# Patient Record
Sex: FEMALE | Race: WHITE | NOT HISPANIC OR LATINO | Employment: FULL TIME | URBAN - NONMETROPOLITAN AREA
[De-identification: names, ages, dates, MRNs, and addresses within clinical notes are randomized per-mention and may not be internally consistent; named-entity substitution may affect disease eponyms.]

---

## 2017-02-24 ENCOUNTER — OFFICE VISIT (OUTPATIENT)
Dept: RETAIL CLINIC | Facility: CLINIC | Age: 22
End: 2017-02-24

## 2017-02-24 VITALS
TEMPERATURE: 99.4 F | OXYGEN SATURATION: 99 % | WEIGHT: 278 LBS | RESPIRATION RATE: 18 BRPM | SYSTOLIC BLOOD PRESSURE: 120 MMHG | HEART RATE: 82 BPM | DIASTOLIC BLOOD PRESSURE: 80 MMHG

## 2017-02-24 DIAGNOSIS — J02.0 STREP PHARYNGITIS: Primary | ICD-10-CM

## 2017-02-24 DIAGNOSIS — J06.9 ACUTE URI: ICD-10-CM

## 2017-02-24 LAB
EXPIRATION DATE: ABNORMAL
EXPIRATION DATE: NORMAL
FLUAV AG NPH QL: NEGATIVE
FLUBV AG NPH QL: NEGATIVE
INTERNAL CONTROL: ABNORMAL
INTERNAL CONTROL: NORMAL
Lab: ABNORMAL
Lab: NORMAL
S PYO AG THROAT QL: POSITIVE

## 2017-02-24 PROCEDURE — 87804 INFLUENZA ASSAY W/OPTIC: CPT | Performed by: NURSE PRACTITIONER

## 2017-02-24 PROCEDURE — 99203 OFFICE O/P NEW LOW 30 MIN: CPT | Performed by: NURSE PRACTITIONER

## 2017-02-24 PROCEDURE — 87880 STREP A ASSAY W/OPTIC: CPT | Performed by: NURSE PRACTITIONER

## 2017-02-24 RX ORDER — CEFDINIR 300 MG/1
300 CAPSULE ORAL 2 TIMES DAILY
Qty: 20 CAPSULE | Refills: 0 | Status: SHIPPED | OUTPATIENT
Start: 2017-02-24

## 2017-02-24 RX ORDER — DESOGESTREL AND ETHINYL ESTRADIOL 0.15-0.03
1 KIT ORAL DAILY
COMMUNITY

## 2017-02-24 RX ORDER — CETIRIZINE HYDROCHLORIDE 10 MG/1
10 TABLET ORAL DAILY
COMMUNITY

## 2017-02-24 RX ORDER — BROMPHENIRAMINE MALEATE, PSEUDOEPHEDRINE HYDROCHLORIDE, AND DEXTROMETHORPHAN HYDROBROMIDE 2; 30; 10 MG/5ML; MG/5ML; MG/5ML
10 SYRUP ORAL EVERY 6 HOURS PRN
Qty: 220 ML | Refills: 0 | Status: SHIPPED | OUTPATIENT
Start: 2017-02-24 | End: 2017-03-06

## 2017-02-24 RX ORDER — ONDANSETRON 4 MG/1
TABLET, ORALLY DISINTEGRATING ORAL
Qty: 10 TABLET | Refills: 0 | Status: SHIPPED | OUTPATIENT
Start: 2017-02-24

## 2017-02-24 NOTE — PATIENT INSTRUCTIONS
Complete the full course of antibiotic, even if you are feeling better. Increase fluids and rest. Use tylenol or ibuprofen for the fever and pain. Eat a soft diet until the pain resolves. Use salt water gargles and throat losenges for pain relief. You are considered contagious for 24 hours following the start of antibiotics. Use good handwashing and do not allow anyone to drink after you. After 48 hours, you should replace your toothbrush or soak it in mouth wash to prevent reinfection. Seek immediate medical attention if the pain becomes severe, or you have trouble breathing, drooling, or great difficulty swallowing.

## 2017-02-24 NOTE — PROGRESS NOTES
Subjective   tC Harvey is a 21 y.o. female.   Chief Complaint   Patient presents with   • URI   • Sore Throat      URI    This is a new problem. The current episode started in the past 7 days. The problem has been unchanged. The maximum temperature recorded prior to her arrival was 100.4 - 100.9 F. The fever has been present for 1 to 2 days. Associated symptoms include congestion, coughing, diarrhea, headaches, nausea, rhinorrhea, sneezing, a sore throat and vomiting (x 1 this a.m.). Pertinent negatives include no abdominal pain, chest pain, ear pain, rash or wheezing. She has tried acetaminophen for the symptoms. The treatment provided moderate relief.   Sore Throat    This is a new problem. The current episode started yesterday. The problem has been gradually worsening. Neither side of throat is experiencing more pain than the other. The maximum temperature recorded prior to her arrival was 100.4 - 100.9 F. Associated symptoms include congestion, coughing, diarrhea, headaches and vomiting (x 1 this a.m.). Pertinent negatives include no abdominal pain, ear pain or shortness of breath. Ear discharge: pressure. Exposure to: . She has tried acetaminophen for the symptoms. The treatment provided moderate relief.        The following portions of the patient's history were reviewed and updated as appropriate: allergies, current medications, past family history, past medical history, past social history, past surgical history and problem list.    Current Outpatient Prescriptions:   •  cetirizine (zyrTEC) 10 MG tablet, Take 10 mg by mouth Daily., Disp: , Rfl:   •  desogestrel-ethinyl estradiol (APRI) 0.15-30 MG-MCG per tablet, Take 1 tablet by mouth Daily., Disp: , Rfl:   •  brompheniramine-pseudoephedrine-DM 30-2-10 MG/5ML syrup, Take 10 mL by mouth Every 6 (Six) Hours As Needed for congestion, cough or allergies for up to 10 days., Disp: 220 mL, Rfl: 0  •  cefdinir (OMNICEF) 300 MG capsule, Take 1  capsule by mouth 2 (Two) Times a Day., Disp: 20 capsule, Rfl: 0  •  ondansetron ODT (ZOFRAN ODT) 4 MG disintegrating tablet, 1-2 tabs every 8 hours prn for n/v, Disp: 10 tablet, Rfl: 0    Review of Systems   Constitutional: Positive for appetite change (slightly decreased), chills and fever. Negative for fatigue.        No body aches   HENT: Positive for congestion, postnasal drip, rhinorrhea, sinus pressure, sneezing, sore throat and voice change. Negative for ear pain, facial swelling and mouth sores. Ear discharge: pressure.    Eyes: Negative for discharge.   Respiratory: Positive for cough. Negative for chest tightness, shortness of breath and wheezing.    Cardiovascular: Negative for chest pain.   Gastrointestinal: Positive for diarrhea, nausea and vomiting (x 1 this a.m.). Negative for abdominal pain.   Skin: Negative for rash.   Neurological: Positive for headaches. Negative for dizziness and light-headedness.     Visit Vitals   • /80 (BP Location: Left arm, Patient Position: Sitting, Cuff Size: Large Adult)   • Pulse 82   • Temp 99.4 °F (37.4 °C) (Temporal Artery )   • Resp 18   • Wt 278 lb (126 kg)   • LMP 02/21/2017   • SpO2 99%       Objective   No Known Allergies    Physical Exam   Constitutional: She is oriented to person, place, and time. She appears well-developed and well-nourished. She appears ill (mild). No distress.   HENT:   Head: Normocephalic and atraumatic.   Right Ear: External ear normal. Tympanic membrane is not erythematous. A middle ear effusion is present.   Left Ear: External ear normal. Tympanic membrane is not erythematous. A middle ear effusion is present.   Nose: Rhinorrhea present. Right sinus exhibits no maxillary sinus tenderness and no frontal sinus tenderness. Left sinus exhibits no maxillary sinus tenderness and no frontal sinus tenderness.   Mouth/Throat: Posterior oropharyngeal erythema (Moderate erythema with clear PND, no exudate) present. No oropharyngeal exudate or  posterior oropharyngeal edema.   Bilateral TM with mild serous effusion.    Significant nasal congestion.   Eyes: Conjunctivae, EOM and lids are normal.   Neck: Full passive range of motion without pain.   Cardiovascular: Normal rate and regular rhythm.    Pulmonary/Chest: Effort normal and breath sounds normal. No respiratory distress.   Abdominal: Soft. Normal appearance and bowel sounds are normal. There is no tenderness.   Lymphadenopathy:        Head (right side): No tonsillar adenopathy present.        Head (left side): No tonsillar adenopathy present.     She has cervical adenopathy (bilateral 1-2cm anterior nodes).   Neurological: She is alert and oriented to person, place, and time.   Skin: Skin is warm and dry. No rash noted.       Assessment/Plan   Ct was seen today for uri and sore throat.    Diagnoses and all orders for this visit:    Strep pharyngitis  -     POC Rapid Strep A    Acute URI  -     POC Influenza A / B    Other orders  -     cefdinir (OMNICEF) 300 MG capsule; Take 1 capsule by mouth 2 (Two) Times a Day.  -     brompheniramine-pseudoephedrine-DM 30-2-10 MG/5ML syrup; Take 10 mL by mouth Every 6 (Six) Hours As Needed for congestion, cough or allergies for up to 10 days.  -     ondansetron ODT (ZOFRAN ODT) 4 MG disintegrating tablet; 1-2 tabs every 8 hours prn for n/v      Lab Results   Component Value Date    RAPFLUA Negative 02/24/2017    RAPFLUB Negative 02/24/2017     Lab Results   Component Value Date    RAPSCRN Positive (A) 02/24/2017